# Patient Record
Sex: MALE | Race: WHITE | HISPANIC OR LATINO | URBAN - METROPOLITAN AREA
[De-identification: names, ages, dates, MRNs, and addresses within clinical notes are randomized per-mention and may not be internally consistent; named-entity substitution may affect disease eponyms.]

---

## 2020-12-10 ENCOUNTER — EMERGENCY (EMERGENCY)
Facility: HOSPITAL | Age: 40
LOS: 1 days | Discharge: HOME | End: 2020-12-10
Attending: EMERGENCY MEDICINE | Admitting: EMERGENCY MEDICINE
Payer: SELF-PAY

## 2020-12-10 VITALS
RESPIRATION RATE: 16 BRPM | SYSTOLIC BLOOD PRESSURE: 133 MMHG | DIASTOLIC BLOOD PRESSURE: 84 MMHG | TEMPERATURE: 98 F | HEART RATE: 82 BPM | OXYGEN SATURATION: 97 %

## 2020-12-10 DIAGNOSIS — Z20.828 CONTACT WITH AND (SUSPECTED) EXPOSURE TO OTHER VIRAL COMMUNICABLE DISEASES: ICD-10-CM

## 2020-12-10 PROCEDURE — 99283 EMERGENCY DEPT VISIT LOW MDM: CPT

## 2020-12-10 NOTE — ED PROVIDER NOTE - PATIENT PORTAL LINK FT
You can access the FollowMyHealth Patient Portal offered by [facility] by registering at the following website: [email or web address]. By joining [employer]’s FollowMyHealth portal, you will also be able to view your health information using other applications (apps) compatible with our system.

## 2020-12-10 NOTE — ED PROVIDER NOTE - CLINICAL SUMMARY MEDICAL DECISION MAKING FREE TEXT BOX
41 y/o M presents to the ED requesting to have screening testing done for COVID-19. Pt endorses he is asymptomatic at this time. On exam, Pt appears well and in no apparent distress. No vital sign derangements. No conversational dyspnea. Nasopharyngeal PCR has been obtained and Pt has been guided on how to obtain their results. General COVID-19 discharge instructions have been given and Pt agrees to receiving results electronically.

## 2020-12-10 NOTE — ED PROVIDER NOTE - OBJECTIVE STATEMENT
41 y/o M presents to the ED requesting to have testing done for COVID-19. Pt endorses he is asymptomatic at this time. Pt denies fevers, chills, coughs, CP, SOB, recent travels and any known contacts with any individuals tested positive for COVID-19.

## 2020-12-11 LAB
SARS-COV-2 IGG SERPL QL IA: POSITIVE
SARS-COV-2 IGM SERPL IA-ACNC: 14.1 INDEX — HIGH

## 2020-12-27 ENCOUNTER — EMERGENCY (EMERGENCY)
Facility: HOSPITAL | Age: 40
LOS: 1 days | Discharge: HOME | End: 2020-12-27
Admitting: EMERGENCY MEDICINE
Payer: COMMERCIAL

## 2020-12-27 VITALS
HEART RATE: 72 BPM | RESPIRATION RATE: 16 BRPM | SYSTOLIC BLOOD PRESSURE: 154 MMHG | TEMPERATURE: 99 F | DIASTOLIC BLOOD PRESSURE: 85 MMHG | OXYGEN SATURATION: 97 %

## 2020-12-27 DIAGNOSIS — Z20.828 CONTACT WITH AND (SUSPECTED) EXPOSURE TO OTHER VIRAL COMMUNICABLE DISEASES: ICD-10-CM

## 2020-12-27 PROCEDURE — 99283 EMERGENCY DEPT VISIT LOW MDM: CPT

## 2020-12-27 NOTE — ED PROVIDER NOTE - CLINICAL SUMMARY MEDICAL DECISION MAKING FREE TEXT BOX
Asymptomatic patient here for COVID screening. Risk of exposure is very low. Reviewed vitals and testing plan with the patient. Encouraged to download the follow my health ryder for test results.

## 2020-12-27 NOTE — ED PROVIDER NOTE - NSFOLLOWUPINSTRUCTIONS_ED_ALL_ED_FT
THE COVID 19 TEST RESULTS  - results may take up to 2-3 days to become available   - if you have consented, you will receive your results electronically   -  you can check "[employer] BRODY or call [phone] to discuss your results with our nursing staff    Please continue to self quarantine (home isolation) until your result is back and follow instructions accordingly  - positive: complete home isolation for a total of 14 days since day of testing and no more fever with feeling back to baseline   - negative: you will be able to stop home isolation but still practice standard precautions, similar to how you would manage a regular flu/cold.    Return to ER for any worsening symptoms, such as persistent fever >100.4F, shortness of breath, coughing up bloody sputum, chest pain, lethargy, and fainting    Please remember to wash your hands frequently (>20 seconds each time), avoid touching your face, and cover your cough/sneeze.  Always wear a mask when you are outside of your home and practice social distancing.    Only take Tylenol for fever/pain control and avoid NSAIDs (ibuprofen/Advil/Aleve/naproxen) due to potential increased risk of exacerbating COVID-19 infection

## 2020-12-27 NOTE — ED PROVIDER NOTE - PATIENT PORTAL LINK FT
You can access the FollowMyHealth Patient Portal offered by [facility] by registering at the following website: [email or web address]. By joining [employer]’s FollowMyHealth portal, you will also be able to view your health information using other applications (apps) compatible with our system.

## 2021-01-22 ENCOUNTER — EMERGENCY (EMERGENCY)
Facility: HOSPITAL | Age: 41
LOS: 1 days | Discharge: HOME | End: 2021-01-22
Attending: EMERGENCY MEDICINE | Admitting: EMERGENCY MEDICINE
Payer: COMMERCIAL

## 2021-01-22 VITALS
OXYGEN SATURATION: 98 % | RESPIRATION RATE: 16 BRPM | TEMPERATURE: 99 F | DIASTOLIC BLOOD PRESSURE: 80 MMHG | SYSTOLIC BLOOD PRESSURE: 136 MMHG | HEART RATE: 64 BPM

## 2021-01-22 DIAGNOSIS — Z20.822 CONTACT WITH AND (SUSPECTED) EXPOSURE TO COVID-19: ICD-10-CM

## 2021-01-22 PROCEDURE — 99283 EMERGENCY DEPT VISIT LOW MDM: CPT

## 2021-01-22 NOTE — ED PROVIDER NOTE - PATIENT PORTAL LINK FT
You can access the FollowMyHealth Patient Portal offered by [facility] by registering at the following website: [email or web address]. By joining [employer]’s FollowMyHealth portal, you will also be able to view your health information using other applications (apps) compatible with our system.

## 2021-01-22 NOTE — ED PROVIDER NOTE - OBJECTIVE STATEMENT
39 y/o M presents to the ED requesting to have testing done for COVID-19. Pt endorses he is asymptomatic at this time. Pt denies fevers, chills, coughs, CP, and SOB. 41 y/o M presents to the ED requesting to have testing done for COVID-19 for work. Pt endorses he is asymptomatic at this time. Pt denies fevers, chills, coughs, CP, and SOB.

## 2021-01-23 LAB — SARS-COV-2 RNA SPEC QL NAA+PROBE: SIGNIFICANT CHANGE UP

## 2021-05-20 PROBLEM — Z78.9 OTHER SPECIFIED HEALTH STATUS: Chronic | Status: ACTIVE | Noted: 2021-01-22

## 2021-05-20 PROBLEM — Z78.9 OTHER SPECIFIED HEALTH STATUS: Chronic | Status: ACTIVE | Noted: 2020-12-10

## 2021-05-25 ENCOUNTER — APPOINTMENT (OUTPATIENT)
Age: 41
End: 2021-05-25
Payer: COMMERCIAL

## 2021-05-25 PROCEDURE — 0001A: CPT

## 2021-06-15 ENCOUNTER — APPOINTMENT (OUTPATIENT)
Age: 41
End: 2021-06-15
Payer: COMMERCIAL

## 2021-06-15 PROCEDURE — 0002A: CPT

## 2022-02-24 NOTE — ED ADULT TRIAGE NOTE - DISCHARGE DATE/TIME
----- Message from Sudheer Hagen MD sent at 2/23/2022 11:34 AM CST -----  Please arrange for repeat CBC in 4 weeks        27-Dec-2020 15:25

## 2023-05-16 PROBLEM — Z00.00 ENCOUNTER FOR PREVENTIVE HEALTH EXAMINATION: Status: ACTIVE | Noted: 2023-05-16
